# Patient Record
Sex: FEMALE | Race: OTHER | HISPANIC OR LATINO | URBAN - METROPOLITAN AREA
[De-identification: names, ages, dates, MRNs, and addresses within clinical notes are randomized per-mention and may not be internally consistent; named-entity substitution may affect disease eponyms.]

---

## 2020-12-02 ENCOUNTER — OFFICE VISIT (OUTPATIENT)
Dept: URBAN - METROPOLITAN AREA CLINIC 63 | Facility: CLINIC | Age: 35
End: 2020-12-02

## 2020-12-03 ENCOUNTER — OFFICE VISIT (OUTPATIENT)
Dept: URBAN - METROPOLITAN AREA TELEHEALTH 2 | Facility: TELEHEALTH | Age: 35
End: 2020-12-03

## 2020-12-22 ENCOUNTER — OFFICE VISIT (OUTPATIENT)
Dept: URBAN - METROPOLITAN AREA SURGERY CENTER 4 | Facility: SURGERY CENTER | Age: 35
End: 2020-12-22

## 2020-12-24 LAB — PATHOLOGY (INDENTED REPORT): (no result)

## 2021-01-07 ENCOUNTER — OFFICE VISIT (OUTPATIENT)
Dept: URBAN - METROPOLITAN AREA TELEHEALTH 2 | Facility: TELEHEALTH | Age: 36
End: 2021-01-07

## 2022-03-09 ENCOUNTER — OFFICE VISIT (OUTPATIENT)
Dept: URBAN - METROPOLITAN AREA CLINIC 63 | Facility: CLINIC | Age: 37
End: 2022-03-09

## 2022-03-10 ENCOUNTER — OFFICE VISIT (OUTPATIENT)
Dept: URBAN - METROPOLITAN AREA TELEHEALTH 2 | Facility: TELEHEALTH | Age: 37
End: 2022-03-10

## 2022-07-09 ENCOUNTER — TELEPHONE ENCOUNTER (OUTPATIENT)
Dept: URBAN - METROPOLITAN AREA CLINIC 121 | Facility: CLINIC | Age: 37
End: 2022-07-09

## 2022-07-09 RX ORDER — OMEPRAZOLE 40 MG/1
TAKE 1 TAB PO QAM 30-60 MINS BEFORE BREAKFAST CAPSULE, DELAYED RELEASE ORAL ONCE A DAY
Refills: 3 | OUTPATIENT
Start: 2019-12-09 | End: 2020-12-03

## 2022-07-09 RX ORDER — DICYCLOMINE HYDROCHLORIDE 20 MG/2ML
INJECTION, SOLUTION INTRAMUSCULAR AS NEEDED
Refills: 0 | OUTPATIENT
Start: 2019-12-09 | End: 2022-03-10

## 2022-07-09 RX ORDER — DICYCLOMINE HYDROCHLORIDE 20 MG/1
TAKE 1 TABLET BY MOUTH 3 TIMES A DAY AS NEEDED TABLET ORAL
Refills: 1 | OUTPATIENT
Start: 2017-07-12 | End: 2017-09-06

## 2022-07-09 RX ORDER — OMEPRAZOLE 40 MG/1
TAKE 1 CAPSULE BY MOUTH EVERY DAY IN THE MORNING 30-60 MINS BEFORE BREAKFAST CAPSULE, DELAYED RELEASE ORAL ONCE A DAY
Refills: 1 | OUTPATIENT
Start: 2021-12-02 | End: 2022-03-10

## 2022-07-09 RX ORDER — OMEPRAZOLE 40 MG/1
TAKE ONE CAPSULE PO ONCE A DAY CAPSULE, DELAYED RELEASE ORAL ONCE A DAY
Refills: 3 | OUTPATIENT
Start: 2018-06-04 | End: 2018-10-08

## 2022-07-09 RX ORDER — IBUPROFEN 600 MG/1
TABLET, FILM COATED ORAL TAKE AS DIRECTED
Refills: 0 | OUTPATIENT
Start: 2022-03-10 | End: 2022-03-10

## 2022-07-09 RX ORDER — DICYCLOMINE HYDROCHLORIDE 20 MG/1
TAKE 1 TABLET BY MOUTH 3 TIMES A DAY AS NEEDED TABLET ORAL THREE TIMES A DAY
Refills: 3 | OUTPATIENT
Start: 2017-09-06 | End: 2018-11-14

## 2022-07-09 RX ORDER — OMEPRAZOLE 40 MG/1
CAPSULE, DELAYED RELEASE ORAL ONCE A DAY
Refills: 0 | OUTPATIENT
Start: 2017-04-03 | End: 2017-12-12

## 2022-07-09 RX ORDER — METOPROLOL SUCCINATE 25 MG/1
TABLET, EXTENDED RELEASE ORAL ONCE A DAY
Refills: 0 | OUTPATIENT
Start: 2017-04-03 | End: 2017-12-12

## 2022-07-09 RX ORDER — OMEPRAZOLE 40 MG/1
TAKE 1 TAB PO QAM 30-60 MINS BEFORE BREAKFAST CAPSULE, DELAYED RELEASE ORAL ONCE A DAY
Refills: 3 | OUTPATIENT
Start: 2018-11-14 | End: 2019-12-09

## 2022-07-09 RX ORDER — OMEPRAZOLE 40 MG/1
TAKE 1 TAB PO QAM 30-60 MINS BEFORE BREAKFAST CAPSULE, DELAYED RELEASE ORAL ONCE A DAY
Refills: 3 | OUTPATIENT
Start: 2021-09-09 | End: 2021-12-02

## 2022-07-09 RX ORDER — OMEPRAZOLE 40 MG/1
TAKE 1 TAB PO QAM 30-60 MINS BEFORE BREAKFAST CAPSULE, DELAYED RELEASE ORAL ONCE A DAY
Refills: 3 | OUTPATIENT
Start: 2020-12-03 | End: 2021-09-09

## 2022-07-09 RX ORDER — OMEPRAZOLE 40 MG/1
CAPSULE, DELAYED RELEASE ORAL ONCE A DAY
Refills: 0 | OUTPATIENT
Start: 2017-12-12 | End: 2018-01-18

## 2022-07-09 RX ORDER — METOPROLOL SUCCINATE 25 MG/1
TABLET, EXTENDED RELEASE ORAL ONCE A DAY
Refills: 0 | OUTPATIENT
Start: 2017-12-12 | End: 2018-11-14

## 2022-07-09 RX ORDER — DICYCLOMINE HYDROCHLORIDE 20 MG/1
TAKE 1 TABLET BY MOUTH 3 TIMES A DAY AS NEEDED TABLET ORAL THREE TIMES A DAY
Refills: 1 | OUTPATIENT
Start: 2017-04-17 | End: 2017-07-12

## 2022-07-09 RX ORDER — DICYCLOMINE HYDROCHLORIDE 10 MG/1
CAPSULE ORAL
Refills: 0 | OUTPATIENT
Start: 2019-12-09 | End: 2022-03-10

## 2022-07-09 RX ORDER — METOPROLOL SUCCINATE 25 MG/1
TABLET, EXTENDED RELEASE ORAL ONCE A DAY
Refills: 0 | OUTPATIENT
Start: 2017-02-21 | End: 2017-04-03

## 2022-07-09 RX ORDER — OMEPRAZOLE 40 MG/1
TAKE ONE CAPSULE PO ONCE A DAY CAPSULE, DELAYED RELEASE ORAL ONCE A DAY
Refills: 1 | OUTPATIENT
Start: 2018-10-08 | End: 2019-12-09

## 2022-07-09 RX ORDER — OMEPRAZOLE 40 MG/1
TAKE ONE CAPSULE PO ONCE A DAY CAPSULE, DELAYED RELEASE ORAL ONCE A DAY
Refills: 3 | OUTPATIENT
Start: 2018-01-18 | End: 2018-01-18

## 2022-07-09 RX ORDER — OMEPRAZOLE 20 MG/1
TAKE ONE CAPSULE PO ONCE A DAY CAPSULE, DELAYED RELEASE ORAL ONCE A DAY
Refills: 2 | OUTPATIENT
Start: 2018-05-24 | End: 2018-11-14

## 2022-07-09 RX ORDER — DICYCLOMINE HYDROCHLORIDE 20 MG/1
TAKE ONE TABLET PO THREE TIMES A DAY AS NEEDED TABLET ORAL THREE TIMES A DAY
Refills: 1 | OUTPATIENT
Start: 2017-02-21 | End: 2017-04-17

## 2022-07-09 RX ORDER — OMEPRAZOLE 40 MG/1
CAPSULE, DELAYED RELEASE ORAL ONCE A DAY
Refills: 0 | OUTPATIENT
Start: 2017-02-21 | End: 2017-04-03

## 2022-07-09 RX ORDER — DICYCLOMINE HYDROCHLORIDE 10 MG/1
TAKE 1 TAB PO Q 6 HRS PRN PAIN RELATED TO IBS CAPSULE ORAL TAKE AS DIRECTED
Refills: 3 | OUTPATIENT
Start: 2022-03-10 | End: 2022-03-25

## 2022-07-10 ENCOUNTER — TELEPHONE ENCOUNTER (OUTPATIENT)
Dept: URBAN - METROPOLITAN AREA CLINIC 121 | Facility: CLINIC | Age: 37
End: 2022-07-10

## 2022-07-10 RX ORDER — DICYCLOMINE HYDROCHLORIDE 10 MG/1
TAKE 1 CAPSULE BY MOUTH EVERY 6 HOURS AS NEEDED FOR PAIN RELATED TO IBS CAPSULE ORAL TAKE AS DIRECTED
Refills: 3 | Status: ACTIVE | COMMUNITY
Start: 2022-03-25

## 2022-07-10 RX ORDER — FLUTICASONE PROPIONATE 50 UG/1
SPRAY, METERED NASAL AS NEEDED
Refills: 0 | Status: ACTIVE | COMMUNITY
Start: 2022-03-10

## 2022-07-10 RX ORDER — OMEPRAZOLE 40 MG/1
TAKE 1 CAPSULE BY MOUTH EVERY DAY IN THE MORNING 30-60 MINS BEFORE BREAKFAST CAPSULE, DELAYED RELEASE ORAL ONCE A DAY
Refills: 3 | Status: ACTIVE | COMMUNITY
Start: 2022-03-10

## 2022-07-10 RX ORDER — OMEPRAZOLE 40 MG/1
TAKE ONE CAPSULE PO ONCE A DAY CAPSULE, DELAYED RELEASE ORAL ONCE A DAY
Refills: 3 | Status: ACTIVE | COMMUNITY
Start: 2018-01-18

## 2022-12-14 ENCOUNTER — OFFICE VISIT (OUTPATIENT)
Dept: URBAN - METROPOLITAN AREA CLINIC 63 | Facility: CLINIC | Age: 37
End: 2022-12-14
Payer: COMMERCIAL

## 2022-12-14 ENCOUNTER — WEB ENCOUNTER (OUTPATIENT)
Dept: URBAN - METROPOLITAN AREA CLINIC 63 | Facility: CLINIC | Age: 37
End: 2022-12-14

## 2022-12-14 VITALS
OXYGEN SATURATION: 99 % | BODY MASS INDEX: 33.92 KG/M2 | WEIGHT: 172.8 LBS | SYSTOLIC BLOOD PRESSURE: 114 MMHG | DIASTOLIC BLOOD PRESSURE: 68 MMHG | HEIGHT: 60 IN | HEART RATE: 78 BPM | TEMPERATURE: 97.8 F

## 2022-12-14 DIAGNOSIS — R12 HEARTBURN: ICD-10-CM

## 2022-12-14 DIAGNOSIS — R13.19 ESOPHAGEAL DYSPHAGIA: ICD-10-CM

## 2022-12-14 PROCEDURE — 99214 OFFICE O/P EST MOD 30 MIN: CPT | Performed by: NURSE PRACTITIONER

## 2022-12-14 RX ORDER — SUCRALFATE 1 G/1
1 TABLET ON AN EMPTY STOMACH TABLET ORAL
Qty: 120 TABLET | Refills: 1 | OUTPATIENT

## 2022-12-14 RX ORDER — DICYCLOMINE HYDROCHLORIDE 10 MG/1
TAKE 1 CAPSULE BY MOUTH EVERY 6 HOURS AS NEEDED FOR PAIN RELATED TO IBS CAPSULE ORAL TAKE AS DIRECTED
Refills: 3 | Status: ACTIVE | COMMUNITY
Start: 2022-03-25

## 2022-12-14 RX ORDER — OMEPRAZOLE 40 MG/1
TAKE 1 CAPSULE BY MOUTH EVERY DAY IN THE MORNING 30-60 MINS BEFORE BREAKFAST CAPSULE, DELAYED RELEASE ORAL ONCE A DAY
Refills: 3 | Status: ACTIVE | COMMUNITY
Start: 2022-03-10

## 2022-12-14 RX ORDER — FLUTICASONE PROPIONATE 50 UG/1
SPRAY, METERED NASAL AS NEEDED
Refills: 0 | Status: ACTIVE | COMMUNITY
Start: 2022-03-10

## 2022-12-14 NOTE — HPI-TODAY'S VISIT:
This is a 37-year-old female patient with a history of GERD who presents to the office with reports of increased reflux/heartburn.  Today she reports waking up in the night with acid coming up into her "throat" and even causing a burning sensation in her ears. She feels like she is choking. She has avoided eating within 3-4 hours of bedtime and isn't consuming multiple gastrotoxins. She remains on Omeprazole 40 mg daily. When this occurs she will take Gaviscon and Pepcid without immediate relief.  Eventually it will calm down, but she is very concerned that this has been damaging her esophagus. She reports problems with swallowing intermittently in that it feels that her food will go down slowly at times. This can also occur with eg soda or milk shakes.   She is also concerned that she might have sleep apnea and may be this is causing her worsening GERD symptoms. She underwent a home test for that about a year ago and it was negative. However, her spouse confirms that she does snore and stops breathing. She is going to address this with her PCP and request an overnight study in a sleep center.  In the past she has tried to wean off of Omeprazole and use an H2 blocker, but experienced a resurgence of her symptoms.  She also could not tolerate decreasing her Omeprazole to 20 mg without significant symptoms.  She reports a history of heartburn and reflux for most of her life. She also has an issue with cramping abdominal pain and episodes of diarrhea related to IBS and does well with Dicyclomine as needed.

## 2022-12-14 NOTE — HPI-PREVIOUS PROCEDURES
Her last EGD was in December 2020.  Findings included visualization of Lozano's type mucosa spanning 1 cm of the distal esophagus with biopsies reporting mild chronic inflammation without intestinal metaplasia.  Diffuse mild inflammation was found in the gastric body and antrum with biopsies reporting reactive gastropathy.  The duodenum was normal.

## 2023-01-11 ENCOUNTER — OFFICE VISIT (OUTPATIENT)
Dept: URBAN - METROPOLITAN AREA SURGERY CENTER 4 | Facility: SURGERY CENTER | Age: 38
End: 2023-01-11

## 2023-01-11 RX ORDER — OMEPRAZOLE 40 MG/1
TAKE 1 CAPSULE BY MOUTH EVERY DAY IN THE MORNING 30-60 MINS BEFORE BREAKFAST CAPSULE, DELAYED RELEASE ORAL ONCE A DAY
Refills: 3 | Status: ACTIVE | COMMUNITY
Start: 2022-03-10

## 2023-01-11 RX ORDER — DICYCLOMINE HYDROCHLORIDE 10 MG/1
TAKE 1 CAPSULE BY MOUTH EVERY 6 HOURS AS NEEDED FOR PAIN RELATED TO IBS CAPSULE ORAL TAKE AS DIRECTED
Refills: 3 | Status: ACTIVE | COMMUNITY
Start: 2022-03-25

## 2023-01-11 RX ORDER — SUCRALFATE 1 G/1
1 TABLET ON AN EMPTY STOMACH TABLET ORAL
Qty: 120 TABLET | Refills: 1 | Status: ACTIVE | COMMUNITY

## 2023-01-11 RX ORDER — FLUTICASONE PROPIONATE 50 UG/1
SPRAY, METERED NASAL AS NEEDED
Refills: 0 | Status: ACTIVE | COMMUNITY
Start: 2022-03-10

## 2023-01-27 ENCOUNTER — TELEPHONE ENCOUNTER (OUTPATIENT)
Dept: URBAN - METROPOLITAN AREA CLINIC 7 | Facility: CLINIC | Age: 38
End: 2023-01-27

## 2023-03-06 ENCOUNTER — TELEPHONE ENCOUNTER (OUTPATIENT)
Dept: URBAN - METROPOLITAN AREA CLINIC 7 | Facility: CLINIC | Age: 38
End: 2023-03-06

## 2023-03-06 RX ORDER — OMEPRAZOLE 40 MG/1
TAKE 1 CAPSULE BY MOUTH EVERY DAY IN THE MORNING 30-60 MINS BEFORE BREAKFAST CAPSULE, DELAYED RELEASE ORAL ONCE A DAY
Qty: 90 TABLET | Refills: 1

## 2023-08-31 ENCOUNTER — ERX REFILL RESPONSE (OUTPATIENT)
Dept: URBAN - METROPOLITAN AREA CLINIC 57 | Facility: CLINIC | Age: 38
End: 2023-08-31

## 2023-08-31 RX ORDER — OMEPRAZOLE 40 MG/1
TAKE 1 CAPSULE BY MOUTH EVERY DAY IN THE MORNING 30-60 MINS BEFORE BREAKFAST CAPSULE, DELAYED RELEASE ORAL ONCE A DAY
Qty: 90 TABLET | Refills: 1 | OUTPATIENT

## 2023-08-31 RX ORDER — OMEPRAZOLE 40 MG/1
TAKE 1 CAPSULE BY MOUTH EVERY DAY IN THE MORNING 30-60 MINS BEFORE BREAKFAST ORAL ONCE A DAY 90 DAYS CAPSULE, DELAYED RELEASE ORAL
Qty: 90 CAPSULE | Refills: 1 | OUTPATIENT

## 2023-10-19 ENCOUNTER — TELEPHONE ENCOUNTER (OUTPATIENT)
Dept: URBAN - METROPOLITAN AREA CLINIC 63 | Facility: CLINIC | Age: 38
End: 2023-10-19

## 2023-10-25 ENCOUNTER — OFFICE VISIT (OUTPATIENT)
Dept: URBAN - METROPOLITAN AREA CLINIC 63 | Facility: CLINIC | Age: 38
End: 2023-10-25

## 2023-10-25 RX ORDER — FLUTICASONE PROPIONATE 50 UG/1
SPRAY, METERED NASAL AS NEEDED
Refills: 0 | Status: ACTIVE | COMMUNITY
Start: 2022-03-10

## 2023-10-25 RX ORDER — OMEPRAZOLE 40 MG/1
TAKE 1 CAPSULE BY MOUTH EVERY DAY IN THE MORNING 30-60 MINS BEFORE BREAKFAST ORAL ONCE A DAY 90 DAYS CAPSULE, DELAYED RELEASE ORAL
Qty: 90 CAPSULE | Refills: 1 | Status: ACTIVE | COMMUNITY

## 2023-10-25 RX ORDER — SUCRALFATE 1 G/1
1 TABLET ON AN EMPTY STOMACH TABLET ORAL
Qty: 120 TABLET | Refills: 1 | Status: ACTIVE | COMMUNITY

## 2023-10-25 RX ORDER — DICYCLOMINE HYDROCHLORIDE 10 MG/1
TAKE 1 CAPSULE BY MOUTH EVERY 6 HOURS AS NEEDED FOR PAIN RELATED TO IBS CAPSULE ORAL TAKE AS DIRECTED
Refills: 3 | Status: ACTIVE | COMMUNITY
Start: 2022-03-25

## 2023-12-05 ENCOUNTER — OFFICE VISIT (OUTPATIENT)
Dept: URBAN - METROPOLITAN AREA CLINIC 60 | Facility: CLINIC | Age: 38
End: 2023-12-05
Payer: COMMERCIAL

## 2023-12-05 VITALS
DIASTOLIC BLOOD PRESSURE: 68 MMHG | HEIGHT: 60 IN | SYSTOLIC BLOOD PRESSURE: 110 MMHG | TEMPERATURE: 98.3 F | OXYGEN SATURATION: 98 % | BODY MASS INDEX: 32.79 KG/M2 | HEART RATE: 77 BPM | WEIGHT: 167 LBS

## 2023-12-05 DIAGNOSIS — R14.2 BELCHING: ICD-10-CM

## 2023-12-05 DIAGNOSIS — K21.9 CHRONIC GERD: ICD-10-CM

## 2023-12-05 DIAGNOSIS — R14.0 BLOATING: ICD-10-CM

## 2023-12-05 DIAGNOSIS — K58.0 IRRITABLE BOWEL SYNDROME WITH DIARRHEA: ICD-10-CM

## 2023-12-05 PROBLEM — 197125005: Status: ACTIVE | Noted: 2023-12-05

## 2023-12-05 PROBLEM — 235595009: Status: ACTIVE | Noted: 2023-12-05

## 2023-12-05 PROCEDURE — 99214 OFFICE O/P EST MOD 30 MIN: CPT | Performed by: NURSE PRACTITIONER

## 2023-12-05 RX ORDER — OMEPRAZOLE 40 MG/1
TAKE 1 CAPSULE BY MOUTH EVERY DAY IN THE MORNING 30-60 MINS BEFORE BREAKFAST ORAL ONCE A DAY 90 DAYS CAPSULE, DELAYED RELEASE ORAL
Qty: 90 CAPSULE | Refills: 1 | Status: ACTIVE | COMMUNITY

## 2023-12-05 RX ORDER — OMEPRAZOLE 40 MG/1
1 CAPSULE 30 MINUTES BEFORE MORNING MEAL CAPSULE, DELAYED RELEASE ORAL ONCE A DAY
Qty: 90 TABLET | Refills: 3 | OUTPATIENT
Start: 2023-12-05

## 2023-12-05 RX ORDER — DICYCLOMINE HYDROCHLORIDE 10 MG/1
1 CAPSULE 30 MINUTES BEFORE EATING CAPSULE ORAL THREE TIMES A DAY
Qty: 270 TABLET | Refills: 3 | OUTPATIENT
Start: 2023-12-05 | End: 2024-11-29

## 2023-12-05 RX ORDER — FLUTICASONE PROPIONATE 50 UG/1
SPRAY, METERED NASAL AS NEEDED
Refills: 0 | Status: ACTIVE | COMMUNITY
Start: 2022-03-10

## 2023-12-05 NOTE — HPI-PREVIOUS PROCEDURES
Her last EGD was in December 2020. Findings included visualization of Lozano's type mucosa spanning 1 cm of the distal esophagus with biopsies reporting mild chronic inflammation without intestinal metaplasia. Diffuse mild inflammation was found in the gastric body and antrum with biopsies reporting reactive gastropathy. The duodenum was normal.  Last colonoscopy was in 2011 with findings of internal hemorrhoids. This was performed for complaints at the time consistent with IBS.

## 2023-12-05 NOTE — HPI-TODAY'S VISIT:
This is a 38-year-old female patient who presents to the office for evaluation of upper GI symptoms and also of worsening IBS. She was last seen in December 2022.  Today she reports two main issues. She still has heartburn and reflux with acid coming up her throat at times. This mainly occurs with certain foods such as tomato sauces so she has been trying to completely avoid them. Typically her diet isn't bad and she tries to consume healthy foods, she knows she is not perfect, but definitely works on it. The worse symptoms are the feelings of fullness and pressure in her stomach and frequent belching at those times. She had been taking Sucralfate, but felt this was making her stomach discomfort worse, so she stopped it several weeks ago. Some of the discomfort subsided somewhat.  Her other issue is related to her bowels. She will get an "anxious feeling", then will sweat profusely and then have cramping lower abdominal pain that necessitates her rushing off to the bathroom urgently. She does have a history of IBS with intermittent episodes of diarrhea and cramping lower abdominal pain, but her problem has been worsening. She had previously been taking Dicyclomine on a prn basis and was stable.  To review, when last seen she reported intermittent episodes of dysphagia to solids and liquids and worsening heartburn and reflux with acid coming up into the back of her throat despite Omeprazole 40 mg daily. She had been taking Gaviscon and Pepcid without immediate relief. We prescribed a course of Sucralfate and scheduled her for a barium swallow and an EGD, which she subsequently canceled. We have not seen her since.

## 2023-12-05 NOTE — PHYSICAL EXAM GASTROINTESTINAL
soft, diffuse mild tenderness throughout, mod epigastric, nondistended , no masses palpable , normal bowel sounds , Liver and Spleen normal in size

## 2023-12-05 NOTE — PHYSICAL EXAM EYES:
Conjuntivae and eyelids appear normal , Sclerae : White without injection 12-07 Na138 mmol/L Glu 107 mg/dL<H> K+ 3.3 mmol/L<L> Cr  1.60 mg/dL<H> BUN 37 mg/dL<H> 12-06 Phos 2.9 mg/dL 11-30 Alb 3.5 g/dL

## 2023-12-12 ENCOUNTER — LAB OUTSIDE AN ENCOUNTER (OUTPATIENT)
Dept: URBAN - METROPOLITAN AREA CLINIC 60 | Facility: CLINIC | Age: 38
End: 2023-12-12

## 2023-12-21 ENCOUNTER — TELEPHONE ENCOUNTER (OUTPATIENT)
Dept: URBAN - METROPOLITAN AREA CLINIC 64 | Facility: CLINIC | Age: 38
End: 2023-12-21

## 2024-01-24 ENCOUNTER — OFFICE VISIT (OUTPATIENT)
Dept: URBAN - METROPOLITAN AREA SURGERY CENTER 4 | Facility: SURGERY CENTER | Age: 39
End: 2024-01-24

## 2024-02-07 ENCOUNTER — OV EP (OUTPATIENT)
Dept: URBAN - METROPOLITAN AREA CLINIC 63 | Facility: CLINIC | Age: 39
End: 2024-02-07

## 2024-02-21 ENCOUNTER — EGD (OUTPATIENT)
Dept: URBAN - METROPOLITAN AREA SURGERY CENTER 4 | Facility: SURGERY CENTER | Age: 39
End: 2024-02-21
Payer: COMMERCIAL

## 2024-02-21 DIAGNOSIS — K29.50 CHRONIC GASTRITIS WITHOUT BLEEDING, UNSPECIFIED GASTRITIS TYPE: ICD-10-CM

## 2024-02-21 DIAGNOSIS — K44.9 DIAPHRAGMATIC HERNIA WITHOUT OBSTRUCTION OR GANGRENE: ICD-10-CM

## 2024-02-21 DIAGNOSIS — K20.90 ESOPHAGITIS, UNSPECIFIED WITHOUT BLEEDING: ICD-10-CM

## 2024-02-21 PROCEDURE — 43239 EGD BIOPSY SINGLE/MULTIPLE: CPT | Performed by: INTERNAL MEDICINE

## 2024-02-21 RX ORDER — OMEPRAZOLE 40 MG/1
1 CAPSULE 30 MINUTES BEFORE MORNING MEAL CAPSULE, DELAYED RELEASE ORAL ONCE A DAY
Qty: 90 TABLET | Refills: 3 | Status: ACTIVE | COMMUNITY
Start: 2023-12-05

## 2024-02-21 RX ORDER — FLUTICASONE PROPIONATE 50 UG/1
SPRAY, METERED NASAL AS NEEDED
Refills: 0 | Status: ACTIVE | COMMUNITY
Start: 2022-03-10

## 2024-02-21 RX ORDER — OMEPRAZOLE 40 MG/1
TAKE 1 CAPSULE BY MOUTH EVERY DAY IN THE MORNING 30-60 MINS BEFORE BREAKFAST ORAL ONCE A DAY 90 DAYS CAPSULE, DELAYED RELEASE ORAL
Qty: 90 CAPSULE | Refills: 1 | Status: ACTIVE | COMMUNITY

## 2024-02-21 RX ORDER — DICYCLOMINE HYDROCHLORIDE 10 MG/1
1 CAPSULE 30 MINUTES BEFORE EATING CAPSULE ORAL THREE TIMES A DAY
Qty: 270 TABLET | Refills: 3 | Status: ACTIVE | COMMUNITY
Start: 2023-12-05 | End: 2024-11-29

## 2024-04-09 ENCOUNTER — OV EP (OUTPATIENT)
Dept: URBAN - METROPOLITAN AREA CLINIC 63 | Facility: CLINIC | Age: 39
End: 2024-04-09

## 2024-04-09 ENCOUNTER — OV EP (OUTPATIENT)
Dept: URBAN - METROPOLITAN AREA CLINIC 63 | Facility: CLINIC | Age: 39
End: 2024-04-09
Payer: COMMERCIAL

## 2024-04-09 ENCOUNTER — LAB (OUTPATIENT)
Dept: URBAN - METROPOLITAN AREA CLINIC 63 | Facility: CLINIC | Age: 39
End: 2024-04-09

## 2024-04-09 VITALS
RESPIRATION RATE: 20 BRPM | DIASTOLIC BLOOD PRESSURE: 70 MMHG | OXYGEN SATURATION: 99 % | SYSTOLIC BLOOD PRESSURE: 120 MMHG | HEIGHT: 60 IN | WEIGHT: 165 LBS | TEMPERATURE: 97.6 F | BODY MASS INDEX: 32.39 KG/M2 | HEART RATE: 76 BPM

## 2024-04-09 DIAGNOSIS — R14.0 BLOATING: ICD-10-CM

## 2024-04-09 DIAGNOSIS — K58.0 IRRITABLE BOWEL SYNDROME WITH DIARRHEA: ICD-10-CM

## 2024-04-09 DIAGNOSIS — K21.9 CHRONIC GERD: ICD-10-CM

## 2024-04-09 DIAGNOSIS — R14.2 BELCHING: ICD-10-CM

## 2024-04-09 PROCEDURE — 99214 OFFICE O/P EST MOD 30 MIN: CPT | Performed by: PHYSICIAN ASSISTANT

## 2024-04-09 RX ORDER — OMEPRAZOLE 40 MG/1
1 CAPSULE 30 MINUTES BEFORE MORNING MEAL CAPSULE, DELAYED RELEASE ORAL TWICE A DAY
Qty: 60 | Refills: 5 | OUTPATIENT
Start: 2024-04-09

## 2024-04-09 RX ORDER — FLUTICASONE PROPIONATE 50 UG/1
SPRAY, METERED NASAL AS NEEDED
Refills: 0 | Status: ACTIVE | COMMUNITY
Start: 2022-03-10

## 2024-04-09 RX ORDER — OMEPRAZOLE 40 MG/1
TAKE 1 CAPSULE BY MOUTH EVERY DAY IN THE MORNING 30-60 MINS BEFORE BREAKFAST ORAL ONCE A DAY 90 DAYS CAPSULE, DELAYED RELEASE ORAL
Qty: 90 CAPSULE | Refills: 1 | Status: ACTIVE | COMMUNITY

## 2024-04-09 RX ORDER — DICYCLOMINE HYDROCHLORIDE 10 MG/1
1 CAPSULE 30 MINUTES BEFORE EATING CAPSULE ORAL THREE TIMES A DAY
Qty: 270 TABLET | Refills: 3 | Status: ACTIVE | COMMUNITY
Start: 2023-12-05 | End: 2024-11-29

## 2024-04-09 NOTE — HPI-TODAY'S VISIT:
38-year-old female with IBS-D and GERD presents to the office for follow-up after EGD which was done on February 21, 2024 to evaluate GERD symptoms, upper abdominal fullness, pressure, and belching.  Her EGD demonstrated nonsevere esophagitis at the GE junction.  Biopsy of the GE junction showed mild reflux type changes.  No evidence of intestinal metaplasia.  3 cm hiatal hernia.  Mild inflammation was found in the gastric body and in the gastric antrum.  Gastric antral biopsy was negative for H. pylori.  The duodenum appeared normal.  At her last office visit in December 2023 she reported heartburn with acid regurgitation mainly occurring with certain foods.  She also reported feeling anxious and would then develop crampy lower abdominal discomfort and diarrhea.  She was prescribed dicyclomine 10 mg 3 times daily as needed.  For GERD, she was advised to continue omeprazole 40 mg once a day and to use famotidine as needed for breakthrough symptoms.  She follows up today doing well.  She had no problems following her procedure. She states she has had frequent heartburn and belching. Not much improvement with once dally PPI. She is adamant about seeing a surgeon to repair her hiatal hernia. She has belching and regurgitation during sleep. She sleeps on an incline. Does not eat before bed. Tries to avoid trigger foods. She has chronically alternating constpation with diarrhea. Occasional abdominal cramping so severe that she starts to sweat and feels like she will pass out. No blood in the stool. No unintnended weight loss.   Colonoscopy 2/2/2011 by Dr. Eason:Normal colonoscopy to the terminal ileum. Capsule endoscopy 2/16/2011:Normal small bowel by PillCam enteroscopy.  No evidence of IBD.

## 2024-05-03 ENCOUNTER — TELEPHONE ENCOUNTER (OUTPATIENT)
Dept: URBAN - METROPOLITAN AREA CLINIC 63 | Facility: CLINIC | Age: 39
End: 2024-05-03

## 2024-05-16 ENCOUNTER — OUT OF OFFICE VISIT (OUTPATIENT)
Dept: URBAN - METROPOLITAN AREA SURGERY CENTER 4 | Facility: SURGERY CENTER | Age: 39
End: 2024-05-16
Payer: COMMERCIAL

## 2024-05-16 DIAGNOSIS — R19.7 DIARRHEA, UNSPECIFIED TYPE: ICD-10-CM

## 2024-05-16 DIAGNOSIS — K58.9 IBS (IRRITABLE BOWEL SYNDROME): ICD-10-CM

## 2024-05-16 DIAGNOSIS — R19.7 DIARRHEA: ICD-10-CM

## 2024-05-16 DIAGNOSIS — K64.1 SECOND DEGREE HEMORRHOIDS: ICD-10-CM

## 2024-05-16 DIAGNOSIS — Z12.11 COLON CANCER SCREENING (HIGH RISK): ICD-10-CM

## 2024-05-16 DIAGNOSIS — R19.4 CHANGE IN BOWEL HABITS: ICD-10-CM

## 2024-05-16 PROCEDURE — 00812 ANES LWR INTST SCR COLSC: CPT | Performed by: NURSE ANESTHETIST, CERTIFIED REGISTERED

## 2024-05-16 PROCEDURE — 45380 COLONOSCOPY AND BIOPSY: CPT | Performed by: INTERNAL MEDICINE

## 2024-05-16 RX ORDER — PANTOPRAZOLE SODIUM 40 MG/1
1 TABLET TABLET, DELAYED RELEASE ORAL TWICE A DAY
Qty: 60 TABLET | Refills: 5 | Status: ACTIVE | COMMUNITY

## 2024-05-16 RX ORDER — OMEPRAZOLE 40 MG/1
1 CAPSULE 30 MINUTES BEFORE MORNING MEAL CAPSULE, DELAYED RELEASE ORAL ONCE A DAY
Qty: 90 TABLET | Refills: 3 | Status: ACTIVE | COMMUNITY
Start: 2023-12-05

## 2024-05-16 RX ORDER — OMEPRAZOLE 40 MG/1
TAKE 1 CAPSULE BY MOUTH EVERY DAY IN THE MORNING 30-60 MINS BEFORE BREAKFAST ORAL ONCE A DAY 90 DAYS CAPSULE, DELAYED RELEASE ORAL
Qty: 90 CAPSULE | Refills: 1 | Status: ACTIVE | COMMUNITY

## 2024-05-16 RX ORDER — DICYCLOMINE HYDROCHLORIDE 10 MG/1
1 CAPSULE 30 MINUTES BEFORE EATING CAPSULE ORAL THREE TIMES A DAY
Qty: 270 TABLET | Refills: 3 | Status: ACTIVE | COMMUNITY
Start: 2023-12-05 | End: 2024-11-29

## 2024-05-16 RX ORDER — FLUTICASONE PROPIONATE 50 UG/1
SPRAY, METERED NASAL AS NEEDED
Refills: 0 | Status: ACTIVE | COMMUNITY
Start: 2022-03-10

## 2024-06-12 ENCOUNTER — DASHBOARD ENCOUNTERS (OUTPATIENT)
Age: 39
End: 2024-06-12

## 2024-06-13 ENCOUNTER — OFFICE VISIT (OUTPATIENT)
Dept: URBAN - METROPOLITAN AREA CLINIC 63 | Facility: CLINIC | Age: 39
End: 2024-06-13

## 2024-06-13 RX ORDER — FLUTICASONE PROPIONATE 50 UG/1
SPRAY, METERED NASAL AS NEEDED
Refills: 0 | Status: ACTIVE | COMMUNITY
Start: 2022-03-10

## 2024-06-13 RX ORDER — OMEPRAZOLE 40 MG/1
CAPSULE, DELAYED RELEASE ORAL
Qty: 90 CAPSULE | Status: ACTIVE | COMMUNITY

## 2024-06-13 RX ORDER — DICYCLOMINE HYDROCHLORIDE 10 MG/1
1 CAPSULE 30 MINUTES BEFORE EATING CAPSULE ORAL THREE TIMES A DAY
Qty: 270 TABLET | Refills: 3 | Status: ACTIVE | COMMUNITY
Start: 2023-12-05 | End: 2024-11-29

## 2024-06-13 RX ORDER — OSELTAMIVIR PHOSPHATE 75 MG/1
CAPSULE ORAL
Qty: 10 CAPSULE | Status: ACTIVE | COMMUNITY

## 2024-12-13 ENCOUNTER — OFFICE VISIT (OUTPATIENT)
Dept: URBAN - METROPOLITAN AREA CLINIC 63 | Facility: CLINIC | Age: 39
End: 2024-12-13
Payer: COMMERCIAL

## 2024-12-13 VITALS
HEART RATE: 81 BPM | TEMPERATURE: 98 F | SYSTOLIC BLOOD PRESSURE: 120 MMHG | BODY MASS INDEX: 33.38 KG/M2 | HEIGHT: 60 IN | DIASTOLIC BLOOD PRESSURE: 78 MMHG | OXYGEN SATURATION: 97 % | WEIGHT: 170 LBS

## 2024-12-13 DIAGNOSIS — K58.0 IRRITABLE BOWEL SYNDROME WITH DIARRHEA: ICD-10-CM

## 2024-12-13 DIAGNOSIS — K52.9 CHRONIC DIARRHEA: ICD-10-CM

## 2024-12-13 PROCEDURE — 99214 OFFICE O/P EST MOD 30 MIN: CPT

## 2024-12-13 RX ORDER — RIFAXIMIN 550 MG/1
1 TABLET TABLET ORAL THREE TIMES A DAY
Qty: 42 TABLET | Refills: 0 | OUTPATIENT
Start: 2024-12-13 | End: 2024-12-27

## 2024-12-13 RX ORDER — ALBUTEROL SULFATE 90 UG/1
1 PUFF AS NEEDED AEROSOL, METERED RESPIRATORY (INHALATION)
Status: ACTIVE | COMMUNITY

## 2024-12-13 NOTE — HPI-PREVIOUS PROCEDURES
Endoscopy with Bravo study, 5/21/2024, Dr. Yoder - DeMeester score 59.7, consistent with symptomatic acid reflux. - Recording was only for 7 hours of the proposed 48h. . Colonoscopy, 5/16/2024, Dr. Rodas - The examined portion of the ileum was normal.  Biopsy. - Internal hemorrhoids.  - The examination was otherwise normal. - Biopsies were taken with cold forceps from the ascending colon and rectum for evaluation of microscopic colitis. - Repeat colonoscopy at age 46 for screening purposes, per Dr. Rodas. . - Terminal ileum biopsy; no diagnostic abnormality.  No definitive evidence of ileitis. - Ascending colon biopsy; no diagnostic abnormality.  No definitive evidence of microscopic colitis. - Rectum colon biopsy; no diagnostic abnormalities.  No definitive evidence of microscopic colitis EGD pathology report, 2/21/2024 - Stomach, antrum biopsy; focal minimal chronic gastritis.  Giemsa special stain is negative for H. pylori organisms. - GE junction biopsy; squamous mucosa with mild reflux type changes.  No evidence of intestinal metaplasia or dysplasia. .  EGD, 2/21/2024, Dr. Griffiths - Nonsevere reflux esophagitis.  Biopsied. - 3 cm hiatal hernia. - Chronic gastritis.  Biopsy. - Normal examined duodenum.

## 2024-12-13 NOTE — HPI-TODAY'S VISIT:
This is a very pleasant 39-year-old female who presents to the office with a chief complaint of "abdominal pain".  Past medical history is significant for GERD, IBS-D.  Past surgical history is significant for colonoscopy, endoscopy.  Family history is noncontributory. Last colonoscopy 2/2/2011, Dr. Eason Last endoscopy (w/ bravo study), 5/21/2024, Dr. Yoder Cardiologist: None . Patient was last seen in the office on 4/9/2024 with Mary Garcia for an EGD follow-up.  At that visit, patient's EGD demonstrated nonsevere esophagitis at GE junction no evidence of intestinal metaplasia.  3 cm hiatal hernia.  In the past she was prescribed dicyclomine 10 mg 3 times daily as needed for IBS symptoms.  For her history of GERD she was prescribed 40 mg omeprazole daily and famotidine as needed.  She followed up at last visit feeling well, but was having frequent heartburn and belching and has not improved since daily use of PPI.  At last visit she was adamant about seeing a surgeon to repair her hiatal hernia.  Patient was referred to Dr. Yoder at last visit for possible TIF.  Records show that Dr. Yoder conducted a EGD with Bravo study with a DeMeester score of 59.7.  CT abdomen and pelvis was ordered to assess for severe abdominal cramping during bowel movements, as well as colonoscopy. . Patient presents today explaining that after she was referred for a hiatal hernia repair by Dr. Yoder, she explains that her acid reflux has completely resolved after the TIF.  Patient explains that she weaned herself off the omeprazole 40 mg and has not needed to take it since the procedure. . In addition patient explains that she still continues to have symptoms of IBS she endorses abdominal cramping, hyperhidrosis, and loose stools a few times a week.  I explained to the patient we can order stool studies as well as try a short course of Xifaxan for 2 weeks and patient will assess for resolution of symptoms. . Patient denies, belching, bloating, constipation,  dysphagia, pyrosis, melena, hematochezia, hematemesis, nausea, vomiting, BRBPR, and unintentional weight loss.

## 2024-12-13 NOTE — PHYSICAL EXAM NECK/THYROID:
normal appearance , without tenderness upon palpation , no deformities , trachea midline , Thyroid normal size , no masses , thyroid nontender 3

## 2024-12-31 LAB
CAMPYLOBACTER SPP. AG,EIA: (no result)
CLOSTRIDIUM DIFFICILE: (no result)
CRYPTOSPORIDIUM ANTIGEN,: (no result)
CYCLOSPORA AND ISOSPORA EXAMINATION: (no result)
FECAL FAT, QUALITATIVE: (no result)
GIARDIA AG, EIA, STOOL: (no result)
IMMUNOGLOBULIN A: 264
INTERPRETATION: (no result)
OVA AND PARASITES, CONC AND PERM SMEAR: (no result)
PANCREATIC ELASTASE, FECAL: >800
SALMONELLA AND SHIGELLA, CULTURE: (no result)
SHIGA TOXINS, EIA W/RFL TO E.COLI O157 CULTURE: (no result)
T-TRANSGLUTAMINASE (TTG) IGG: 4.3
TISSUE TRANSGLUTAMINASE AB, IGA: <1

## 2025-02-17 ENCOUNTER — TELEPHONE ENCOUNTER (OUTPATIENT)
Dept: URBAN - METROPOLITAN AREA CLINIC 63 | Facility: CLINIC | Age: 40
End: 2025-02-17

## 2025-02-17 RX ORDER — OMEPRAZOLE 40 MG/1
CAPSULE, DELAYED RELEASE ORAL
Qty: 90 CAPSULE

## 2025-02-25 ENCOUNTER — OFFICE VISIT (OUTPATIENT)
Dept: URBAN - METROPOLITAN AREA CLINIC 63 | Facility: CLINIC | Age: 40
End: 2025-02-25
Payer: COMMERCIAL

## 2025-02-25 VITALS
BODY MASS INDEX: 32.2 KG/M2 | HEART RATE: 75 BPM | SYSTOLIC BLOOD PRESSURE: 134 MMHG | OXYGEN SATURATION: 99 % | HEIGHT: 60 IN | DIASTOLIC BLOOD PRESSURE: 80 MMHG | TEMPERATURE: 98.6 F | WEIGHT: 164 LBS

## 2025-02-25 DIAGNOSIS — K21.9 CHRONIC GERD: ICD-10-CM

## 2025-02-25 DIAGNOSIS — K58.0 IRRITABLE BOWEL SYNDROME WITH DIARRHEA: ICD-10-CM

## 2025-02-25 PROCEDURE — 99214 OFFICE O/P EST MOD 30 MIN: CPT | Performed by: PHYSICIAN ASSISTANT

## 2025-02-25 RX ORDER — ALBUTEROL SULFATE 90 UG/1
1 PUFF AS NEEDED AEROSOL, METERED RESPIRATORY (INHALATION)
Status: ACTIVE | COMMUNITY

## 2025-02-25 NOTE — HPI-TODAY'S VISIT:
39-year-old female with IBS-D presents to the office for follow-up.  She was last seen in the office in December 2024.  She was referred to Dr. Yoder at her prior office visit in April 2024 for persistent GERD symptoms.  She underwent EGD with Burger study by Dr. Yoder in May 2024.  Her DeMeester score was 59.7.  At her office visit in December, she reported that she underwent HH repair with Dr Tovar in 2024 and that all of her GERD symptoms resolved and she weaned herself off of omeprazole.  She reported continuation of IBS symptoms in the form of abdominal cramping, sweating, and loose stools a few times a week.  She was prescribed a course of Xifaxan for IBS.  Stool studies were done on December 20, 2024 which were negative for pathogens including C. difficile.  Stool pancreatic elastase and fecal fat analysis were normal.  Celiac panel was negative.  She follows up today doing well. She has chronically alternating bowel habits with diarrhea and constipation. States around her menses, she has consitpation and is using colace as needed. She has a lot of gas. Her bowel habits otherwised seem normal but she will have a few days of loose stools. She is not taking a daily fiber supplement.   Colonoscopy 5/16/2024 to assess chronic diarrhea and abdominal pain:Normal colonoscopy to the terminal ileum with the exception of grade 2 internal hemorrhoids.  Biopsy of the terminal ileum was normal.  Random colon biopsies were negative for microscopic colitis.

## 2025-08-26 ENCOUNTER — OFFICE VISIT (OUTPATIENT)
Dept: URBAN - METROPOLITAN AREA CLINIC 63 | Facility: CLINIC | Age: 40
End: 2025-08-26